# Patient Record
(demographics unavailable — no encounter records)

---

## 2024-11-25 NOTE — HISTORY OF PRESENT ILLNESS
[Irregular Periods] : irregular periods [FreeTextEntry2] : Juana is a 17-year 5-month-old female here for follow up for obesity, acanthosis nigricans and irregular periods. Patient on metformin 1,000 mg BID (dose increased from 500 mg BID in July 2024). She reports diarrhea since her dose was increased. She misses metformin 2-3 days per week due to forgetting.   Diet recall:   - breakfast: skips or has a bagel  - lunch:  [employer] canned pasta  - snacks: yogurt parfait  - dinner: rice, mac and cheese, chicken, pork chops,   Exercise: yoga, walking for few hours in the park x 2-3 days a week Periods are still irregular. Has an appointment with GYN in December 2024.  [FreeTextEntry1] : Menarche at 10 yo; Nov 2024

## 2024-11-25 NOTE — DATA REVIEWED
[FreeTextEntry1] : 11/23/24 CBC/CMP WNL, free T4 1.1, TSH 0.95, cholesterol 136, LDL Chol 87, HDL Chol 37, TG 62, HbA1C 5.8%, fasting glucose 96, fasting insulin 28.9(H),  LH 9.0, FSH 2.0, total testosterone 50.7.   4/10/24 CBC/CMP WNL, free T4  1.1, TSH 1.32, fasting glucose 98, fasting insulin 20.2(H), HbA1C 5.7%, LH 13.7, FSH 2.2, total testosterone 51.9, free testosterone 0.5, DHEAS 322, 17-OH Progesterone 48, Androstenedione 137, estradiol 188, prolactin 13.8.   2/3/24 HbA1C 6.1%

## 2024-11-25 NOTE — ASSESSMENT
[FreeTextEntry1] : 17 year 5-month-old female with morbid obesity, complicate by insulin resistance and irregular periods in the settings of type 2 diabetes in maternal grandparents. Patient poorly compliant with prescribed metformin.   Plan:  1. Will switch to metformin ER 1,000 mg BID. Encouraged compliance.  2. Encouraged healthy diet changes and exercise 3. RTC 4 month

## 2024-11-25 NOTE — PHYSICAL EXAM
[Obese] : obese [Acanthosis Nigricans___] : acanthosis nigricans over [unfilled] [Normal Appearance] : normal appearance [Well formed] : well formed [Normally Set] : normally set [WNL for age] : within normal limits of age [None] : there were no thyroid nodules [Normal S1 and S2] : normal S1 and S2 [Clear to Ausculation Bilaterally] : clear to auscultation bilaterally [Abdomen Tenderness] : non-tender [Abdomen Soft] : soft [] : no hepatosplenomegaly [5] : was Junior stage 5 [Moderate] : moderate [Junior Stage ___] : the Junior stage for breast development was [unfilled] [Normal] : normal  [Goiter] : no goiter [Murmur] : no murmurs [FreeTextEntry1] : no masses, no nipple discharge

## 2025-04-09 NOTE — ASSESSMENT
[FreeTextEntry1] : 17 year 9-month-old female with morbid obesity, complicate by insulin resistance and irregular periods in the settings of type 2 diabetes in maternal grandparents. Patient poorly compliant with prescribed metformin.   Plan:  1. Encouraged compliance. Recommended to take metformin ER 1000mg once per day with dinner 2. Encouraged healthy diet changes and exercise 3. Fasting lab work as prescribed.  4. RTC 4 month

## 2025-04-09 NOTE — HISTORY OF PRESENT ILLNESS
[Irregular Periods] : irregular periods [FreeTextEntry2] : Juana is a 17-year 9-month-old female here for follow up for obesity, acanthosis nigricans and irregular periods. Patient on metformin ER 1,000 mg BID. She is inconsistent with the medication. Sometimes misses her doses for couple of weeks and takes randomly.   She has 1-2 meals per day, mostly home cooked, has smaller portions.  She has yoga and weightlifting class in school.   Periods are still irregular. She got her period 2 days ago on 4/7/25. Prior had her period in Nov 2024. Denied excessive hair growth and hair loss from scalp, acne and oily skin.   Juana is going away to college in [place] in August.        [FreeTextEntry1] : Menarche at 10 yo; 4/7/25

## 2025-04-09 NOTE — PHYSICAL EXAM
[Obese] : obese [Acanthosis Nigricans___] : acanthosis nigricans over [unfilled] [Normal Appearance] : normal appearance [Well formed] : well formed [Normally Set] : normally set [WNL for age] : within normal limits of age [None] : there were no thyroid nodules [Normal S1 and S2] : normal S1 and S2 [Clear to Ausculation Bilaterally] : clear to auscultation bilaterally [Abdomen Soft] : soft [Abdomen Tenderness] : non-tender [] : no hepatosplenomegaly [5] : was Junior stage 5 [Moderate] : moderate [Junior Stage ___] : the Junior stage for breast development was [unfilled] [Normal] : normal  [Goiter] : no goiter [Murmur] : no murmurs [FreeTextEntry1] : no masses, no nipple discharge